# Patient Record
Sex: MALE | Race: WHITE | NOT HISPANIC OR LATINO
[De-identification: names, ages, dates, MRNs, and addresses within clinical notes are randomized per-mention and may not be internally consistent; named-entity substitution may affect disease eponyms.]

---

## 2020-09-09 PROBLEM — Z00.00 ENCOUNTER FOR PREVENTIVE HEALTH EXAMINATION: Status: ACTIVE | Noted: 2020-09-09

## 2020-09-11 ENCOUNTER — APPOINTMENT (OUTPATIENT)
Dept: NEUROLOGY | Facility: CLINIC | Age: 50
End: 2020-09-11
Payer: COMMERCIAL

## 2020-09-11 ENCOUNTER — NON-APPOINTMENT (OUTPATIENT)
Age: 50
End: 2020-09-11

## 2020-09-11 DIAGNOSIS — R90.89 OTHER ABNORMAL FINDINGS ON DIAGNOSTIC IMAGING OF CENTRAL NERVOUS SYSTEM: ICD-10-CM

## 2020-09-11 DIAGNOSIS — Z86.73 PERSONAL HISTORY OF TRANSIENT ISCHEMIC ATTACK (TIA), AND CEREBRAL INFARCTION W/OUT RESIDUAL DEFICITS: ICD-10-CM

## 2020-09-11 DIAGNOSIS — Z86.59 PERSONAL HISTORY OF OTHER MENTAL AND BEHAVIORAL DISORDERS: ICD-10-CM

## 2020-09-11 PROCEDURE — 99215 OFFICE O/P EST HI 40 MIN: CPT | Mod: 95

## 2020-09-11 NOTE — REVIEW OF SYSTEMS
[Sleep Disturbances] : sleep disturbances [Memory Lapses or Loss] : memory loss [Negative] : Neurological [Dizziness] : no dizziness [Seizures] : no convulsions [Fainting] : no fainting [Lightheadedness] : no lightheadedness [Vertigo] : no vertigo

## 2020-09-11 NOTE — PHYSICAL EXAM
[Oriented To Time, Place, And Person] : oriented to person, place, and time [Affect] : the affect was normal [Person] : oriented to person [Time] : oriented to time [Cranial Nerves Optic (II)] : visual acuity intact bilaterally,  visual fields full to confrontation, pupils equal round and reactive to light [Place] : oriented to place [Cranial Nerves Facial (VII)] : face symmetrical [Motor Strength] : muscle strength was normal in all four extremities

## 2020-09-11 NOTE — ASSESSMENT
[FreeTextEntry1] : - pt with residual focus problems and memory problems: likely relating to underlying depression. Pt with old ischemic changes on MRI head, which likely not contributing to this\par - recommended to continue with aspirin and statins\par - us carotid done: minimal soft plaque in left common carotid artery. will follow up with US carotid in 6 month\par - echo : no sig finding\par - suggest follow up with PMD/cardiology for event monitoring\par - outpt psychiatry follow up for depression (which is likely contributing to memory and focus)\par - ok to resume physical activity (instructed to start slowly)\par - follow up in 6 month\par - suggested to try melatonin first for sleep issue\par

## 2020-09-11 NOTE — HISTORY OF PRESENT ILLNESS
[FreeTextEntry1] : Pt is 50 yo RH M with recent hospitalization for ams and depression. Neuro follow up after finding of old ischemic changes on MRI head\par \par Pt seen via telehealth. Pt's wife present.\par \par Pt was initially seen by me at [facility] on 9/2 after he was admitted for depression and drug overdose. On initial exam, pt noted with increasing agitation and confusion with increased wbc. Pt given Ativan for suspected seizure with improvement, then subsequently had spinal tap done witch showed no evidence of cns infection. Routine eeg done later with no sign of seizure activity. Pt had MRI head done which showed small ([id number]) right frontal lesion suspicious for prior small ischemic event.\par \par Pt was then transferred to behavioral health unit, where pt continued to improve, and was discharged home on Lexapro and Risperdal. Given old ischemic changes. pt was also started on aspirin 81mg daily and Lipitor 10mg daily\par \par Pt seen today: appears to be in good health. No focal deficits as per wife. Pt does have residual difficulty with memory and focus. No HA, no blurry vision, no nausea.  Also reports intermittent sleep difficulty\par \par

## 2020-09-11 NOTE — REASON FOR VISIT
[Home] : at home, [unfilled] , at the time of the visit. [Medical Office: ([facility])___] : at the medical office located in  [Spouse] : spouse [Verbal consent obtained from patient] : the patient, [unfilled] [Follow-Up: _____] : a [unfilled] follow-up visit

## 2020-09-11 NOTE — DISCUSSION/SUMMARY
[FreeTextEntry1] : Pt is 50 yo RH M with recent hospitalization for ams and depression. Neuro follow up after finding of old ischemic changes on MRI head

## 2020-09-30 ENCOUNTER — TRANSCRIPTION ENCOUNTER (OUTPATIENT)
Age: 50
End: 2020-09-30

## 2021-10-08 ENCOUNTER — RESULT REVIEW (OUTPATIENT)
Age: 51
End: 2021-10-08

## 2025-06-17 ENCOUNTER — NON-APPOINTMENT (OUTPATIENT)
Age: 55
End: 2025-06-17

## 2025-06-21 ENCOUNTER — NON-APPOINTMENT (OUTPATIENT)
Age: 55
End: 2025-06-21